# Patient Record
Sex: FEMALE | Race: WHITE | NOT HISPANIC OR LATINO | URBAN - NONMETROPOLITAN AREA
[De-identification: names, ages, dates, MRNs, and addresses within clinical notes are randomized per-mention and may not be internally consistent; named-entity substitution may affect disease eponyms.]

---

## 2019-08-16 ENCOUNTER — IMPORTED ENCOUNTER (OUTPATIENT)
Dept: URBAN - NONMETROPOLITAN AREA CLINIC 1 | Facility: CLINIC | Age: 44
End: 2019-08-16

## 2019-08-16 PROBLEM — H00.011: Noted: 2019-08-16

## 2019-08-16 PROCEDURE — 99213 OFFICE O/P EST LOW 20 MIN: CPT

## 2019-08-16 NOTE — PATIENT DISCUSSION
Internal Cass FRIAS x 2 -  discussed findings w/patient-  start Keflex 500mg BID x 7 days-  start hot compresses several times a day-  start Tobradex QID OD x 1 week-  RTC 1 week or prn

## 2020-03-13 ENCOUNTER — IMPORTED ENCOUNTER (OUTPATIENT)
Dept: URBAN - NONMETROPOLITAN AREA CLINIC 1 | Facility: CLINIC | Age: 45
End: 2020-03-13

## 2020-03-13 PROBLEM — H52.4: Noted: 2020-03-13

## 2020-03-13 PROBLEM — H52.03: Noted: 2020-03-13

## 2020-03-13 PROBLEM — H52.223: Noted: 2020-03-13

## 2020-03-13 PROCEDURE — 92014 COMPRE OPH EXAM EST PT 1/>: CPT

## 2020-03-13 PROCEDURE — 92015 DETERMINE REFRACTIVE STATE: CPT

## 2020-03-13 NOTE — PATIENT DISCUSSION
Compound Hyperopic Astigmatims oU w/Presbyopia-  discussed findings w/patient-  new spectacle Rx issued-  ok to continue with NVO-  explained to patient high caffiene intake can affect vision. Patient reports her BS is good.  Patient instructed to RTC if symtpoms worsen -  monitor yearly or prn; 's Notes: MR 3/13/2020DFE 3/13/2020

## 2022-04-09 ASSESSMENT — VISUAL ACUITY
OS_CC: 20/20
OD_CC: 20/25
OS_CC: 20/25-2
OD_CC: 20/30

## 2022-04-09 ASSESSMENT — TONOMETRY
OD_IOP_MMHG: 12
OS_IOP_MMHG: 12